# Patient Record
Sex: FEMALE | Race: OTHER
[De-identification: names, ages, dates, MRNs, and addresses within clinical notes are randomized per-mention and may not be internally consistent; named-entity substitution may affect disease eponyms.]

---

## 2022-06-21 PROBLEM — Z00.00 ENCOUNTER FOR PREVENTIVE HEALTH EXAMINATION: Status: ACTIVE | Noted: 2022-06-21

## 2022-07-06 ENCOUNTER — APPOINTMENT (OUTPATIENT)
Dept: PAIN MANAGEMENT | Facility: CLINIC | Age: 42
End: 2022-07-06

## 2022-07-13 ENCOUNTER — APPOINTMENT (OUTPATIENT)
Dept: PAIN MANAGEMENT | Facility: CLINIC | Age: 42
End: 2022-07-13

## 2022-07-13 PROCEDURE — 93040 RHYTHM ECG WITH REPORT: CPT | Mod: 59

## 2022-07-13 PROCEDURE — 64494 INJ PARAVERT F JNT L/S 2 LEV: CPT | Mod: RT

## 2022-07-13 PROCEDURE — 72100 X-RAY EXAM L-S SPINE 2/3 VWS: CPT

## 2022-07-13 PROCEDURE — 64495 INJ PARAVERT F JNT L/S 3 LEV: CPT | Mod: RT

## 2022-07-13 PROCEDURE — 64493 INJ PARAVERT F JNT L/S 1 LEV: CPT | Mod: RT

## 2022-07-13 PROCEDURE — 93770 DETERMINATION VENOUS PRESS: CPT | Mod: 25

## 2022-07-13 PROCEDURE — 00630 ANES PX LUMBAR REGION NOS: CPT | Mod: QZ,P2

## 2022-07-13 PROCEDURE — 94761 N-INVAS EAR/PLS OXIMETRY MLT: CPT | Mod: 25

## 2022-07-20 ENCOUNTER — APPOINTMENT (OUTPATIENT)
Dept: PAIN MANAGEMENT | Facility: CLINIC | Age: 42
End: 2022-07-20

## 2022-07-20 PROCEDURE — 64493 INJ PARAVERT F JNT L/S 1 LEV: CPT | Mod: LT

## 2022-07-20 PROCEDURE — 64495 INJ PARAVERT F JNT L/S 3 LEV: CPT | Mod: LT

## 2022-07-20 PROCEDURE — 94761 N-INVAS EAR/PLS OXIMETRY MLT: CPT

## 2022-07-20 PROCEDURE — 93770 DETERMINATION VENOUS PRESS: CPT | Mod: 25

## 2022-07-20 PROCEDURE — 72100 X-RAY EXAM L-S SPINE 2/3 VWS: CPT

## 2022-07-20 PROCEDURE — 00630 ANES PX LUMBAR REGION NOS: CPT | Mod: QZ,P2

## 2022-07-20 PROCEDURE — 64494 INJ PARAVERT F JNT L/S 2 LEV: CPT | Mod: LT

## 2022-07-20 PROCEDURE — 93040 RHYTHM ECG WITH REPORT: CPT | Mod: 59

## 2022-07-20 NOTE — PROCEDURE
[FreeTextEntry1] : LUMBAR MEDIAL BRANCH INJECTION UNDER FLUOROSCOPY\par \par  [FreeTextEntry3] : Preoperative Diagnosis: Lumbar spondylosis; facet arthropathy L3-4, L4-5, L5-S1\par Postoperative Diagnosis: Same\par Procedure:\par                    1. Diagnostic Lumbar medial branch nerve injection LEFT L3-4, L4-5, L5-S1\par                    2. Fluoroscopic guidance and localization of needle\par \par \par Physician: Ronnie Barron M.D.\par Anesthesiologist/CRNA: Ms. Baird\par Anesthesia: MAC\par Medical Necessity:  Failure of conservative management.\par \par \par \par Consent:  Though unusual, the possible complications including infection, bleeding, nerve damage, hospital admission, stroke, pneumothorax, death or failure of the procedure are theoretically possible. The patient was educated about the of the procedure and alternative therapies. All questions were answered and the patient freely gave consent to proceed.\par Indication for Fluoroscopy:  This procedure requires the precise placement of the spinal needle into the epidural space.  It is the only way to accurately and safely perform the injection.\par \par \par \par Procedure Note: \par After obtaining written consent, the patient was placed on the fluoroscopic table in the prone position with the pillow placed under the hips. A betadine prep was performed and the area surrounded by sterile drapes. A time out was performed.  Fluoroscopy unit was positioned over the patient and images of the spine (AP and oblique views) obtained.  The entry sites for the above noted levels median branch were determined and local anesthesia with lidocaine 2%-1cc was provided. At each level a 22guage 3 ½ inch spinal needle was placed at the level of the median branch to the facet under fluoroscopic guidance.  A dose of Lidocaine 2% 0.5cc was administered at each level the needles at each level were withdrawn following administration of the medication.  There was no significant bleeding at the site.  A dressing was placed by the assistant nurse. \par \par UCG- NEGATIVE\par \par \par \par Findings: Lumbar spine AP and oblique views with x-ray degenerative changes noted.\par \par \par Complications: none. \par \par Disposition: : I have examined the patient and there are no new physical findings since the original presentation.  Sensory and motor function were intact. The patient met discharge criteria see nurses notes. The discharge instruction sheet was reviewed and given to the patient. The patient was discharged home with a . \par \par Comment: Immediate relief today : 70%. If at least 70% relief or 50% greater than 24 hours, would proceed to RFA. If 50% relief is less than 24 hours, would repeat to confirm for RFA. If less than 50% relief, assess for other pain generators. Call if any problems\par \par Indication for RFA: The pt had a positive response to medial branch diagnostic injections and is considered a good candidate for the thermal RF ablation procedure. The diagnostic injection provided at least 50% reduction in pain for the duration of the local anesthetic.\par \par \par \par The following criteria have been met: 1) failed response to at least 3 months of conservative management; 2) patient has LBP that is non-radicular, suggesting facet joint origin supported by absence of nerve root compression documented on the medical record on H&P and radiographic evaluation; 3) minimum of 6 months has elapsed since any prior RF treatments. If prior ablation therapy has been performed, it provided at least 50% relief for minimum of 10-12 weeks; 4) no prior spinal fusion at the vertebral level being treated;\par \par \par \par This document was signed by:\par \par Ronnie Barron MD \par Board Certified, Anesthesiology \par Board Certified, Pain Medicine \par \par

## 2022-07-26 ENCOUNTER — APPOINTMENT (OUTPATIENT)
Dept: PAIN MANAGEMENT | Facility: CLINIC | Age: 42
End: 2022-07-26
Payer: COMMERCIAL

## 2022-07-26 ENCOUNTER — APPOINTMENT (OUTPATIENT)
Dept: PAIN MANAGEMENT | Facility: CLINIC | Age: 42
End: 2022-07-26

## 2022-07-26 PROCEDURE — 64635 DESTROY LUMB/SAC FACET JNT: CPT | Mod: RT

## 2022-07-26 PROCEDURE — 64636Z: CUSTOM | Mod: RT

## 2022-07-26 PROCEDURE — 72100 X-RAY EXAM L-S SPINE 2/3 VWS: CPT

## 2022-07-26 PROCEDURE — 93040 RHYTHM ECG WITH REPORT: CPT | Mod: 59

## 2022-07-26 PROCEDURE — 94761 N-INVAS EAR/PLS OXIMETRY MLT: CPT | Mod: 59

## 2022-07-26 PROCEDURE — 93770 DETERMINATION VENOUS PRESS: CPT | Mod: 59

## 2022-07-26 NOTE — PROCEDURE
[FreeTextEntry1] : MEDIAL BRANCH NERVE RHIZOTOMY- LUMBAR  LEVELS\par \par  [FreeTextEntry3] : reoperative Diagnosis: Lumbar facet arthropathy L3-4, L4-5, L5-S1\par Postoperative Diagnosis: Same\par Procedure: Neurotomy of the medial branch nerve of right  L3-4, L4-5, L5-S1\par Physician: Ronnie Barron M.D. \par Anesthesiologist/CRNA: Mr. Sullivan\par Anesthesia: MAC\par Medical Necessity: Facet arthropathy; intractable axial pain amenable only to medial branch nerve injections. Criteria met for a medial branch nerve neurotomy.\par Indication for Fluoroscopy:  This procedure requires the precise placement of the spinal needle into the epidural space.  It is the only way to accurately and safely perform the injection.\par \par \par \par Consent:  Though unusual, the possible complications including infection, bleeding, nerve damage, hospital admission, stroke, pneumothorax, death or failure of the procedure are theoretically possible. The patient was educated about the of the procedure and alternative therapies. All questions were answered and the patient freely gave consent to proceed.\par Prior to the procedure, we discussed the risks of the radiofrequency such as hematoma, infection, and nerve or spinal cord injury. The patient was also told that sometimes patients will notice lower extremity weakness immediately following the procedure due to extravasation of local anesthetic solution onto the main nerve root during the procedure. In addition, the patient was informed that 1 to 2 weeks of perioperative discomfort following the procedure is to be expected.\par UCG- NEGATIVE\par \par \par Monitoring:  Patient had continuous blood pressure, EKG, and pulse oximetry throughout the case. See nurse's notes.\par \par \par PROCEDURE NOTE:\par The patient was placed in a prone position on a fluoroscopy table.  The back was prepped and draped in a sterile fashion and a C-arm fluoroscopic device was used for localization of the radiofrequency target sites. A time out was performed. The [email or web address] 100 mm disposable Shyam*-coated cannula with a 10 mm active tip was adjusted via the sizing collar so that the electrode fit just inside the inner bevel at the end of the bare metal. Prior to beginning the procedure, the internal test mode function of the radiofrequency unit was checked to make sure the machine was functioning properly. The initial target zones included the junction of the transverse process and the pedicle at each level.\par \par At each target site, the radiofrequency cannula were placed in a parallel fashion to the x-ray beam and directed in a bulls-eye fashion down to the target zones. The cannula was then walked over the leading edge and advanced approximately 2 to 3 mm in an anterior direction.\par \par The second target zones included the superior and medial junction of the sacral ala. At each target site, a 2% Lidocaine solution was used to anesthetize the skin and subcutaneous tissues. Again, the radiofrequency cannulas were placed in a parallel fashion to the x-ray beam and directed in a bulls-eye fashion down to the target zones. The cannula was then walked over the leading edge and advanced approximately 2 to 3 mm in an anterior direction. A lateral view of the spine was performed to insure that the cannulas were not too far to the opening of the foraminal canals. Sensory stimulation at 50 hertz was performed at each target area. Referral of the sensory stimulation was noted at less than 1 volt over the paravertebral area or hip. Motor dissociation at 2 hertz was obtained by stimulating up to 3 times the voltage of the sensory stimulation and insuring a lack of motor movement in the lower extremities. Once the radiofrequency cannula was in correct position, a 2% Lidocaine with 10mg of dexamethasone solution was used to rapidly anesthetize the lesion site. After a thirty second delay, thermal lesions were then created at each level for 90 seconds at a temperature of 80¿ C. After the lesions were created, the cannulas were removed and sterile bandages placed at the puncture sites. The patient tolerated the procedure well.\par \par \par \par Findings: Lumbar spine AP and oblique views with x-ray degenerative changes noted.\par \par Complications: None. The patient tolerated the procedure well.\par \par Disposition: I have examined the patient and there are no new physical findings since the original presentation.  Sensory and motor function were intact. The patient met discharge criteria see nurses' notes. The discharge instruction sheet was reviewed and given to the patient. The patient was discharged home with a . \par .\par \par Comment: RFA today, follow up in 2-4 weeks. Call if any problem.\par \par Indication for RFA: The pt had a positive response to medial branch diagnostic injections and is considered a good candidate for the thermal RF ablation procedure. The diagnostic injection provided at least 50% reduction in pain for the duration of the local anesthetic. \par The following criteria have been met: 1) failed response to at least 3 months of conservative management; 2) patient has LBP that is non-radicular, suggesting facet joint origin supported by absence of nerve root compression documented on the medical record on H&P and radiographic evaluation; 3) minimum of 6 months has elapsed since any prior RF treatments. If prior ablation therapy has been performed, it provided at least 50% relief for minimum of 10-12 weeks; 4) no prior spinal fusion at the vertebral level being treated;\par \par \par \par This document was signed by:\par \par Ronnie Barron MD \par Board Certified, Anesthesiology \par Board Certified, Pain Medicine  \par

## 2022-07-26 NOTE — PROCEDURE
[FreeTextEntry1] : MEDIAL BRANCH NERVE RHIZOTOMY- LUMBAR    [FreeTextEntry3] : \par MEDIAL BRANCH NERVE RHIZOTOMY- LUMBAR  LEVELS\par \par \par \par Date:  07/26/2022\par \par Patient: TATIANA MAURO\par \par  \par \par  \par \par \par Preoperative Diagnosis: Lumbar facet arthropathy L3-4, L4-5, L5-S1\par Postoperative Diagnosis: Same\par Procedure: Neurotomy of the medial branch nerve of right  L3-4, L4-5, L5-S1\par Physician: Ronnie Barron M.D. \par Anesthesiologist/CRNA: Mr. Sullivan\par Anesthesia: MAC\par Medical Necessity: Facet arthropathy; intractable axial pain amenable only to medial branch nerve injections. Criteria met for a medial branch nerve neurotomy.\par Indication for Fluoroscopy:  This procedure requires the precise placement of the spinal needle into the epidural space.  It is the only way to accurately and safely perform the injection.\par \par \par \par Consent:  Though unusual, the possible complications including infection, bleeding, nerve damage, hospital admission, stroke, pneumothorax, death or failure of the procedure are theoretically possible. The patient was educated about the of the procedure and alternative therapies. All questions were answered and the patient freely gave consent to proceed.\par Prior to the procedure, we discussed the risks of the radiofrequency such as hematoma, infection, and nerve or spinal cord injury. The patient was also told that sometimes patients will notice lower extremity weakness immediately following the procedure due to extravasation of local anesthetic solution onto the main nerve root during the procedure. In addition, the patient was informed that 1 to 2 weeks of perioperative discomfort following the procedure is to be expected.\par \par \par \par Monitoring:  Patient had continuous blood pressure, EKG, and pulse oximetry throughout the case. See nurse's notes.\par \par \par PROCEDURE NOTE:\par The patient was placed in a prone position on a fluoroscopy table.  The back was prepped and draped in a sterile fashion and a C-arm fluoroscopic device was used for localization of the radiofrequency target sites. A time out was performed. The [employer] 100 mm disposable Shyam*-coated cannula with a 10 mm active tip was adjusted via the sizing collar so that the electrode fit just inside the inner bevel at the end of the bare metal. Prior to beginning the procedure, the internal test mode function of the radiofrequency unit was checked to make sure the machine was functioning properly. The initial target zones included the junction of the transverse process and the pedicle at each level.\par \par At each target site, the radiofrequency cannula were placed in a parallel fashion to the x-ray beam and directed in a bulls-eye fashion down to the target zones. The cannula was then walked over the leading edge and advanced approximately 2 to 3 mm in an anterior direction.\par \par The second target zones included the superior and medial junction of the sacral ala. At each target site, a 2% Lidocaine solution was used to anesthetize the skin and subcutaneous tissues. Again, the radiofrequency cannulas were placed in a parallel fashion to the x-ray beam and directed in a bulls-eye fashion down to the target zones. The cannula was then walked over the leading edge and advanced approximately 2 to 3 mm in an anterior direction. A lateral view of the spine was performed to insure that the cannulas were not too far to the opening of the foraminal canals. Sensory stimulation at 50 hertz was performed at each target area. Referral of the sensory stimulation was noted at less than 1 volt over the paravertebral area or hip. Motor dissociation at 2 hertz was obtained by stimulating up to 3 times the voltage of the sensory stimulation and insuring a lack of motor movement in the lower extremities. Once the radiofrequency cannula was in correct position, a 2% Lidocaine with 10mg of dexamethasone solution was used to rapidly anesthetize the lesion site. After a thirty second delay, thermal lesions were then created at each level for 90 seconds at a temperature of 80¿ C. After the lesions were created, the cannulas were removed and sterile bandages placed at the puncture sites. The patient tolerated the procedure well.\par \par \par \par Findings: Lumbar spine AP and oblique views with x-ray degenerative changes noted.\par \par Complications: None. The patient tolerated the procedure well.\par \par Disposition: I have examined the patient and there are no new physical findings since the original presentation.  Sensory and motor function were intact. The patient met discharge criteria see nurses' notes. The discharge instruction sheet was reviewed and given to the patient. The patient was discharged home with a . \par .\par \par Comment: RFA today, follow up in 2-4 weeks. Call if any problem.\par \par Indication for RFA: The pt had a positive response to medial branch diagnostic injections and is considered a good candidate for the thermal RF ablation procedure. The diagnostic injection provided at least 50% reduction in pain for the duration of the local anesthetic. \par The following criteria have been met: 1) failed response to at least 3 months of conservative management; 2) patient has LBP that is non-radicular, suggesting facet joint origin supported by absence of nerve root compression documented on the medical record on H&P and radiographic evaluation; 3) minimum of 6 months has elapsed since any prior RF treatments. If prior ablation therapy has been performed, it provided at least 50% relief for minimum of 10-12 weeks; 4) no prior spinal fusion at the vertebral level being treated;\par \par \par \par This document was signed by:\par \par Ronnie Barron MD \par Board Certified, Anesthesiology \par Board Certified, Pain Medicine

## 2022-07-26 NOTE — PROCEDURE
[FreeTextEntry1] : MEDIAL BRANCH NERVE RHIZOTOMY- LUMBAR  LEVELS\par \par  [FreeTextEntry3] : reoperative Diagnosis: Lumbar facet arthropathy L3-4, L4-5, L5-S1\par Postoperative Diagnosis: Same\par Procedure: Neurotomy of the medial branch nerve of right  L3-4, L4-5, L5-S1\par Physician: Ronnie Barron M.D. \par Anesthesiologist/CRNA: Mr. Sullivan\par Anesthesia: MAC\par Medical Necessity: Facet arthropathy; intractable axial pain amenable only to medial branch nerve injections. Criteria met for a medial branch nerve neurotomy.\par Indication for Fluoroscopy:  This procedure requires the precise placement of the spinal needle into the epidural space.  It is the only way to accurately and safely perform the injection.\par \par \par \par Consent:  Though unusual, the possible complications including infection, bleeding, nerve damage, hospital admission, stroke, pneumothorax, death or failure of the procedure are theoretically possible. The patient was educated about the of the procedure and alternative therapies. All questions were answered and the patient freely gave consent to proceed.\par Prior to the procedure, we discussed the risks of the radiofrequency such as hematoma, infection, and nerve or spinal cord injury. The patient was also told that sometimes patients will notice lower extremity weakness immediately following the procedure due to extravasation of local anesthetic solution onto the main nerve root during the procedure. In addition, the patient was informed that 1 to 2 weeks of perioperative discomfort following the procedure is to be expected.\par UCG- NEGATIVE\par \par \par Monitoring:  Patient had continuous blood pressure, EKG, and pulse oximetry throughout the case. See nurse's notes.\par \par \par PROCEDURE NOTE:\par The patient was placed in a prone position on a fluoroscopy table.  The back was prepped and draped in a sterile fashion and a C-arm fluoroscopic device was used for localization of the radiofrequency target sites. A time out was performed. The [employer] 100 mm disposable Shyam*-coated cannula with a 10 mm active tip was adjusted via the sizing collar so that the electrode fit just inside the inner bevel at the end of the bare metal. Prior to beginning the procedure, the internal test mode function of the radiofrequency unit was checked to make sure the machine was functioning properly. The initial target zones included the junction of the transverse process and the pedicle at each level.\par \par At each target site, the radiofrequency cannula were placed in a parallel fashion to the x-ray beam and directed in a bulls-eye fashion down to the target zones. The cannula was then walked over the leading edge and advanced approximately 2 to 3 mm in an anterior direction.\par \par The second target zones included the superior and medial junction of the sacral ala. At each target site, a 2% Lidocaine solution was used to anesthetize the skin and subcutaneous tissues. Again, the radiofrequency cannulas were placed in a parallel fashion to the x-ray beam and directed in a bulls-eye fashion down to the target zones. The cannula was then walked over the leading edge and advanced approximately 2 to 3 mm in an anterior direction. A lateral view of the spine was performed to insure that the cannulas were not too far to the opening of the foraminal canals. Sensory stimulation at 50 hertz was performed at each target area. Referral of the sensory stimulation was noted at less than 1 volt over the paravertebral area or hip. Motor dissociation at 2 hertz was obtained by stimulating up to 3 times the voltage of the sensory stimulation and insuring a lack of motor movement in the lower extremities. Once the radiofrequency cannula was in correct position, a 2% Lidocaine with 10mg of dexamethasone solution was used to rapidly anesthetize the lesion site. After a thirty second delay, thermal lesions were then created at each level for 90 seconds at a temperature of 80¿ C. After the lesions were created, the cannulas were removed and sterile bandages placed at the puncture sites. The patient tolerated the procedure well.\par \par \par \par Findings: Lumbar spine AP and oblique views with x-ray degenerative changes noted.\par \par Complications: None. The patient tolerated the procedure well.\par \par Disposition: I have examined the patient and there are no new physical findings since the original presentation.  Sensory and motor function were intact. The patient met discharge criteria see nurses' notes. The discharge instruction sheet was reviewed and given to the patient. The patient was discharged home with a . \par .\par \par Comment: RFA today, follow up in 2-4 weeks. Call if any problem.\par \par Indication for RFA: The pt had a positive response to medial branch diagnostic injections and is considered a good candidate for the thermal RF ablation procedure. The diagnostic injection provided at least 50% reduction in pain for the duration of the local anesthetic. \par The following criteria have been met: 1) failed response to at least 3 months of conservative management; 2) patient has LBP that is non-radicular, suggesting facet joint origin supported by absence of nerve root compression documented on the medical record on H&P and radiographic evaluation; 3) minimum of 6 months has elapsed since any prior RF treatments. If prior ablation therapy has been performed, it provided at least 50% relief for minimum of 10-12 weeks; 4) no prior spinal fusion at the vertebral level being treated;\par \par \par \par This document was signed by:\par \par Ronnie Barron MD \par Board Certified, Anesthesiology \par Board Certified, Pain Medicine  \par

## 2022-08-01 ENCOUNTER — APPOINTMENT (OUTPATIENT)
Dept: PAIN MANAGEMENT | Facility: CLINIC | Age: 42
End: 2022-08-01
Payer: COMMERCIAL

## 2022-08-01 ENCOUNTER — APPOINTMENT (OUTPATIENT)
Dept: PAIN MANAGEMENT | Facility: CLINIC | Age: 42
End: 2022-08-01

## 2022-08-01 PROCEDURE — 93770 DETERMINATION VENOUS PRESS: CPT | Mod: 25

## 2022-08-01 PROCEDURE — 93040 RHYTHM ECG WITH REPORT: CPT | Mod: 59

## 2022-08-01 PROCEDURE — 72100 X-RAY EXAM L-S SPINE 2/3 VWS: CPT

## 2022-08-01 PROCEDURE — 00630 ANES PX LUMBAR REGION NOS: CPT | Mod: QZ,P2

## 2022-08-01 PROCEDURE — 64636Z: CUSTOM | Mod: LT

## 2022-08-01 PROCEDURE — 94761 N-INVAS EAR/PLS OXIMETRY MLT: CPT

## 2022-08-01 PROCEDURE — 64635 DESTROY LUMB/SAC FACET JNT: CPT | Mod: LT

## 2022-08-01 NOTE — PROCEDURE
[FreeTextEntry1] : MEDIAL BRANCH NERVE RHIZOTOMY- LUMBAR  LEVELS [FreeTextEntry3] : Preoperative Diagnosis: Lumbar facet arthropathy L3-4, L4-5, L5-S1\par Postoperative Diagnosis: Same\par Procedure: Neurotomy of the medial branch nerve of left  L3-4, L4-5, L5-S1\par Physician: Ronnie Barron M.D. \par Anesthesiologist/CRNA: Ms. Baird\par Anesthesia: MAC\par Medical Necessity: Facet arthropathy; intractable axial pain amenable only to medial branch nerve injections. Criteria met for a medial branch nerve neurotomy.\par Indication for Fluoroscopy:  This procedure requires the precise placement of the spinal needle into the epidural space.  It is the only way to accurately and safely perform the injection.\par \par \par \par Consent:  Though unusual, the possible complications including infection, bleeding, nerve damage, hospital admission, stroke, pneumothorax, death or failure of the procedure are theoretically possible. The patient was educated about the of the procedure and alternative therapies. All questions were answered and the patient freely gave consent to proceed.\par Prior to the procedure, we discussed the risks of the radiofrequency such as hematoma, infection, and nerve or spinal cord injury. The patient was also told that sometimes patients will notice lower extremity weakness immediately following the procedure due to extravasation of local anesthetic solution onto the main nerve root during the procedure. In addition, the patient was informed that 1 to 2 weeks of perioperative discomfort following the procedure is to be expected.\par \par \par \par Monitoring:  Patient had continuous blood pressure, EKG, and pulse oximetry throughout the case. See nurse's notes.\par \par \par PROCEDURE NOTE:\par The patient was placed in a prone position on a fluoroscopy table.  The back was prepped and draped in a sterile fashion and a C-arm fluoroscopic device was used for localization of the radiofrequency target sites. A time out was performed. The Webtogs  mm disposable Shyam*-coated cannula with a 10 mm active tip was adjusted via the sizing collar so that the electrode fit just inside the inner bevel at the end of the bare metal. Prior to beginning the procedure, the internal test mode function of the radiofrequency unit was checked to make sure the machine was functioning properly. The initial target zones included the junction of the transverse process and the pedicle at each level.\par \par At each target site, the radiofrequency cannula were placed in a parallel fashion to the x-ray beam and directed in a bulls-eye fashion down to the target zones. The cannula was then walked over the leading edge and advanced approximately 2 to 3 mm in an anterior direction.\par \par The second target zones included the superior and medial junction of the sacral ala. At each target site, a 2% Lidocaine solution was used to anesthetize the skin and subcutaneous tissues. Again, the radiofrequency cannulas were placed in a parallel fashion to the x-ray beam and directed in a bulls-eye fashion down to the target zones. The cannula was then walked over the leading edge and advanced approximately 2 to 3 mm in an anterior direction. A lateral view of the spine was performed to insure that the cannulas were not too far to the opening of the foraminal canals. Sensory stimulation at 50 hertz was performed at each target area. Referral of the sensory stimulation was noted at less than 1 volt over the paravertebral area or hip. Motor dissociation at 2 hertz was obtained by stimulating up to 3 times the voltage of the sensory stimulation and insuring a lack of motor movement in the lower extremities. Once the radiofrequency cannula was in correct position, a 2% Lidocaine with 10mg of dexamethasone solution was used to rapidly anesthetize the lesion site. After a thirty second delay, thermal lesions were then created at each level for 90 seconds at a temperature of 80¿ C. After the lesions were created, the cannulas were removed and sterile bandages placed at the puncture sites. The patient tolerated the procedure well.\par \par \par \par Findings: Lumbar spine AP and oblique views with x-ray degenerative changes noted.\par \par Complications: None. The patient tolerated the procedure well.\par \par Disposition: I have examined the patient and there are no new physical findings since the original presentation.  Sensory and motor function were intact. The patient met discharge criteria see nurses' notes. The discharge instruction sheet was reviewed and given to the patient. The patient was discharged home with a . \par .\par \par Comment: RFA today, follow up in 2-4 weeks. Call if any problem.\par \par Indication for RFA: The pt had a positive response to medial branch diagnostic injections and is considered a good candidate for the thermal RF ablation procedure. The diagnostic injection provided at least 50% reduction in pain for the duration of the local anesthetic. \par The following criteria have been met: 1) failed response to at least 3 months of conservative management; 2) patient has LBP that is non-radicular, suggesting facet joint origin supported by absence of nerve root compression documented on the medical record on H&P and radiographic evaluation; 3) minimum of 6 months has elapsed since any prior RF treatments. If prior ablation therapy has been performed, it provided at least 50% relief for minimum of 10-12 weeks; 4) no prior spinal fusion at the vertebral level being treated;\par \par \par \par This document was signed by:\par \par Ronnie Barron MD \par Board Certified, Anesthesiology \par Board Certified, Pain Medicine  \par

## 2022-08-29 ENCOUNTER — APPOINTMENT (OUTPATIENT)
Dept: PAIN MANAGEMENT | Facility: CLINIC | Age: 42
End: 2022-08-29

## 2022-08-29 VITALS
HEIGHT: 63 IN | WEIGHT: 220 LBS | HEART RATE: 98 BPM | BODY MASS INDEX: 38.98 KG/M2 | SYSTOLIC BLOOD PRESSURE: 159 MMHG | DIASTOLIC BLOOD PRESSURE: 98 MMHG

## 2022-08-29 PROCEDURE — 96136 PSYCL/NRPSYC TST PHY/QHP 1ST: CPT | Mod: 59

## 2022-08-29 PROCEDURE — 99215 OFFICE O/P EST HI 40 MIN: CPT | Mod: 25

## 2022-08-29 NOTE — HISTORY OF PRESENT ILLNESS
[FreeTextEntry1] : Ms. Bosch is a 41-year-old woman who presents today with complaint of excruciating back pain with radiation to the right lower extremity. Pain has been present for the past couple of months, worse this past week. Pain developed after no injury, accident, or fall. She describes it as severe, rating it a 10 right now and 10 at worst. Pain is constant shooting, sharp, pressure-like, cramping, and throbbing sensation that worsens in no typical pattern. Coughing/sneezing and bowel movements increase patient's pain. Lying down, standing, sitting, and walking decrease patient's pain for approximately 10-15 minutes. Denies recent change in bowel or bladder habits. Patient was taking gabapentin but wishes to discontinue secondary to side effects of drowsiness and brain fog.\par \par TODAY: Since last visit, she underwent a Left L3-S1 radiofrequency ablation on 8-1-2022 and a Right L3-S1 radiofrequency ablation on 7- with approximately 90% relief of her axial lower back pain. \par \par Today, she is presenting with pain that starts in her right sided buttock area and radiates into the right lower extremity. She notes associated numbness and tingling in the right leg. She states at times it hurts to walk after sitting for a long period of time. She currently rates the pain at a 8/10 on the pain scale. She states the pain is present daily. \par

## 2022-08-29 NOTE — DATA REVIEWED
[FreeTextEntry1] : MRI of lumbar spine dated 01/26/2022 revealed mild desiccated mildly bulging lower lumbar disc L3-4 to L5-1. L5-S1 central right central disc herniation with inferior extrusion displaces the right S1 root. L5-1 mild facet hypertrophy and mild foraminal narrowing.\par \par SOAPP: Scored a 0 , low risk.\par  \par [place] REGISTRY: Reviewed .  \par  \par UDS: No data obtained today. \par  \par Medications that trigger a UDS: Benzodiazepines (Ativan, Xanax, Valium) etc, Barbiturates, Narcotics (Avinza, Butrans, hydrocodone, Codeine, Catarina, Methadone, Morphine, MS Contin, Opana, oxycodone, Oxycontin, Suboxone etc), Pregabalin (Lyrica), Tramadol (Ultacet, Utram etc), Tapentadol, (Nucynta) and Elist Drugs (cocaine, THC, Etc.)\par  \par Risk factors: Bipolar Illness, positive for any an illicit drugs, history of any ETOH and drug abuse, any signs of diversion, Sharing Meds, selling meds. Non consistent [place] drug reporting and above 120meq of morphine\par  \par Low risk: Patient has combination of a low risk SOAP and no risk factors. UDS would be repeated randomly every quarter\par

## 2022-08-29 NOTE — PHYSICAL EXAM
[de-identified] : Constitutional\par GENERAL APPEARANCE OF PATIENT IS WELL DEVELOPED, WELL NOURISHED, BODY HABITUS NORMAL, WELL GROOMED, NO DEFORMITIES NOTED. \par \par Head\par -          Atraumatic and Normocephalic \par \par Eyes, Nose, and Throat:\par -          External inspection of ears and nose are normal overall without scars, lesions, or masses noted\par -          Assessment of hearing is normal\par \par Neck\par -          Examination of neck shows no masses, overall appearance is normal, neck is symmetric, tracheal position is midline, no crepitus is noted\par -          Examination of thyroid shows no enlargement, tenderness or masses\par \par Respiratory\par -          Assessment of respiratory effort shows no intercostal retractions, no use of accessory muscles, unlabored breathing, and normal diaphragmatic movement.\par \par Cardiovascular\par -          Examination of extremities show no edema or varicosities\par \par Musculoskeletal\par -           Inspection and palpation of digits and nails shows no clubbing, cyanosis, nodules, drainage, fluctuance, petechiae\par \par 1)         Spine- tenderness into the lumbar paraspinals. ROM restricted. Pain with flexion. Positive SLR on the right. \par \par 2)         Neck- inspection and palpation shows no misalignment, asymmetry, crepitation, defects, tenderness, masses, effusions. ROM is normal without crepitation or contracture. No instability or subluxation or laxity is noted. No abnormal movements.\par \par 3)         RUE- inspection and palpation shows no misalignment, asymmetry, crepitation, defects, tenderness, masses, effusions. ROM is normal without crepitation or contracture. No instability or subluxation or laxity is noted. No abnormal movements.\par \par 4)         LUE-inspection and palpation shows no misalignment, asymmetry, crepitation, defects, tenderness, masses, effusions. ROM is normal without crepitation or contracture. No instability or subluxation or laxity is noted. No abnormal movements.\par \par 5)         RLE- inspection and palpation shows no misalignment, asymmetry, crepitation, defects, tenderness, masses, effusions. ROM is normal without crepitation or contracture. No instability or subluxation or laxity is noted. No abnormal movements.\par \par 6)         LLE-inspection and palpation shows no misalignment, asymmetry, crepitation, defects, tenderness, masses, effusions. ROM is normal without crepitation or contracture. No instability or subluxation or laxity is noted. No abnormal movements.\par \par Skin\par -           Inspection of skin and subcutaneous tissue shows no rashes, lesions or ulcers\par -           Palpation of skin and subcutaneous tissue shows no rashes, no indurations, subcutaneous nodules or tightening.\par \par  Abdomen\par -           Soft; Non-tender\par \par Neurologic\par -           CN 2-12 are grossly intact\par -           No sensory or motor deficits in the upper and lower extremities\par -           Adequate strength in upper and lower extremities\par \par Psychiatric\par -           Patients judgment and insight are intact\par -           Oriented to time, place and person\par -           Recent and remote memory intact.\par

## 2022-08-29 NOTE — ASSESSMENT
[FreeTextEntry1] : 42 year old female presenting with lumbar radicular pain. Patient is presenting with radicular pain with impairment in ADLs and functionality.  The pain has not responded to conservative care, including medications, stretching, as well as active modalities, such as physical therapy.  Imaging studies as well as physical exam findings corroborate the symptomatology and radicular pain.  We will proceed with an epidural at this point. We will proceed with a Right L5-S1 transforaminal epidural steroid injection with sedation. We will clear her to return back to work on 9-. In addition, I will also have her receive a Lumbar LSO brace. Follow up in 6 weeks will be made for reassessment. I have explained the findings to the patient and all questions have been answered.\par \par Right L5-S1 transforaminal epidural steroid injection with sedation.\par \par Patient had a MRI that shows a radicular component along with pain referred into the lower extremity. Patient has trialed rehab (Home exercise, physical therapy or chiropractic care) and medications I will schedule a L4-5 SNRI.\par \par Risk, benefits, pros and cons of procedure were explained to the patient using models and diagrams and their questions were answered. \par \par \par The patient has severe anxiety of procedures that necessitates monitored anesthesia care (MAC). The procedure performed will be close to major nerves, arteries, and spinal cord and/or joint structures. Due to the proximity of these structures, we need the patient to be still during the procedure.  With the help of MAC, this will be safely achieved and decrease the risk of any complications.\par \par Patient has pain in spinal movement along with weakness in extension and flexion. I will put in for a lumbar brace with lateral supports to be worn no more than 4 hours a day and 2 hours in a row to decrease facet and disc load, to decrease pain, increase activity, increase function, to reduce pain by restricting mobility of the trunk, to facilitate healing of the spine and related Soft tissues and to support weak spinal muscles used to help the patient with rehab and home exercise program stressing walking.  Other exercises discussed include swimming, elliptical , recumbent bike, Henry chi and Yoga. Use things that heat like hot shower or icy heat before rehab and exercising and at the beginning of the day, and ice (ice in a bag never directly on the skin) after activity and at the end of the day.\par \par Neuropsychological SOAPP and PCS testing was performed as an evaluation of cognition, mood, personality, behavior to assess likelihood of addiction, misuse, other aberrant medication-related behaviors, and different thoughts and feelings that may be associated with pain. The total time spent rendering and interpreting the service was approximately 20 minutes. Results will be implemented in the appropriate care of the patient\par \par A total of 46 minutes was spent on this visit, reviewing previous notes/consultations/imaging, counseling the patient on appropriate biomechanics impacting the pain, ordering tests if needed, refilling meds if needed, and documenting the findings in the note.\par \par Entered by Carol Danielle, acting as scribe for Dr. Barron.\par  \par The documentation recorded by the scribe, in my presence, accurately reflects the service I personally performed, and the decisions made by me with my edits as appropriate.\par  \par Best Regards, \par Ronnie Barron MD \par Board Certified, Anesthesiology \par Board Certified, Pain Medicine\par \par \par

## 2022-09-21 ENCOUNTER — APPOINTMENT (OUTPATIENT)
Dept: PAIN MANAGEMENT | Facility: CLINIC | Age: 42
End: 2022-09-21

## 2022-09-21 ENCOUNTER — APPOINTMENT (OUTPATIENT)
Dept: PAIN MANAGEMENT | Facility: CLINIC | Age: 42
End: 2022-09-21
Payer: COMMERCIAL

## 2022-09-21 PROCEDURE — 00630 ANES PX LUMBAR REGION NOS: CPT | Mod: QZ,P2

## 2022-09-21 PROCEDURE — 94761 N-INVAS EAR/PLS OXIMETRY MLT: CPT

## 2022-09-21 PROCEDURE — 93040 RHYTHM ECG WITH REPORT: CPT | Mod: 59

## 2022-09-21 PROCEDURE — 93770 DETERMINATION VENOUS PRESS: CPT | Mod: 59

## 2022-09-21 PROCEDURE — 72100 X-RAY EXAM L-S SPINE 2/3 VWS: CPT

## 2022-09-21 PROCEDURE — 64483 NJX AA&/STRD TFRM EPI L/S 1: CPT | Mod: RT

## 2022-09-21 NOTE — PROCEDURE
[FreeTextEntry3] : Preoperative Diagnosis: Lumbar Radiculopathy\par \par Procedure:\par \par 1. Selective Right L5-S1 Transforaminal Lumbar Epidural Nerve Root Injection under Fluoroscopy\par \par 2. Epidurography\par \par 3. Fluoroscopic guidance and localization of needle\par \par Physician: Ronnie Barron M.D.\par \par Anesthesiologist/CRNA: Oli\par \par Anesthesia: MAC\par \par Medical Necessity: Failure of conservative management.\par \par Consent: Though unusual, the possible complications including infection, bleeding, nerve damage, hospital admission, stroke, pneumothorax, death or failure of the procedure are theoretically possible. The patient was educated about the of the procedure and alternative therapies. All questions were answered and the patient freely gave consent to proceed.\par \par Indication for Fluoroscopy: This procedure requires the precise placement of the spinal needle into the epidural space. It is the only way to accurately and safely perform the injection.\par \par PROCEDURE NOTE:\par \par After obtaining written consent, the patient was then positioned on the fluoroscopy table in the prone position with a pillow beneath the pelvis to reduce lumbar lordosis. The lumbar area was prepped with betadine solution and draped in the usual manner. A time out was performed. The fluoroscope was used to identify the L3///L4///L5 vertebral body on the AP projection. It was then rotated into an oblique projection until the superior articulating process of the S1 (inferior) vertebra is projected beneath the 6 o'clock position of the L5 (superior) vertebrae. The 22 gauge 5inch needle was inserted in the skin at a point overlying the superior articulating process of the inferior vertebra and aimed for the 6 o'clock position of the superior vertebrae's pedicle. After the needle contacted bone, a lateral projection was obtained to insure that the needle tip was in proximity with the vertebral body. Paresthesias were not noted. One ml of Omnipaque 240 was injected and a neurogram was obtained. Following demonstration of the neurogram, 1 ml of Preservative free normal saline and 1 ml of dexamethasone (10mg) was injected. The small volume and relatively high concentration was chosen to preserve selectivity and diagnostic value of the injection. There was no CSF or heme identified.\par \par Epidurogram: The nerve root was observed in its outline on the neurogram. Distal and proximal spread was noted.\par \par Findings: Lumbar Spine AP and oblique views with x-ray degenerative changes noted.\par \par Complications: none.\par \par Disposition: I have examined the patient and there are no new physical findings since original presentation. The patient was discharged home with a . The discharge instruction sheet was given to the patient. Motor function was intact.\par \par Comment: 1st TFESI today, depending on effectiveness would schedule 2nd TFESI in 1-2 weeks vs caudal epidural steroid vs follow up in office. Call if any problems\par \par Ronnie Barron MD \par Board Certified, Anesthesiology \par Board Certified, Pain Medicine

## 2022-09-28 ENCOUNTER — APPOINTMENT (OUTPATIENT)
Dept: PAIN MANAGEMENT | Facility: CLINIC | Age: 42
End: 2022-09-28

## 2022-10-05 ENCOUNTER — APPOINTMENT (OUTPATIENT)
Dept: PAIN MANAGEMENT | Facility: CLINIC | Age: 42
End: 2022-10-05

## 2022-10-27 ENCOUNTER — APPOINTMENT (OUTPATIENT)
Dept: PAIN MANAGEMENT | Facility: CLINIC | Age: 42
End: 2022-10-27

## 2022-10-27 VITALS
WEIGHT: 220 LBS | SYSTOLIC BLOOD PRESSURE: 166 MMHG | BODY MASS INDEX: 38.98 KG/M2 | HEART RATE: 82 BPM | DIASTOLIC BLOOD PRESSURE: 99 MMHG | HEIGHT: 63 IN

## 2022-10-27 DIAGNOSIS — M51.26 OTHER INTERVERTEBRAL DISC DEGENERATION, LUMBAR REGION: ICD-10-CM

## 2022-10-27 DIAGNOSIS — M51.36 OTHER INTERVERTEBRAL DISC DEGENERATION, LUMBAR REGION: ICD-10-CM

## 2022-10-27 DIAGNOSIS — M54.16 RADICULOPATHY, LUMBAR REGION: ICD-10-CM

## 2022-10-27 PROCEDURE — 99214 OFFICE O/P EST MOD 30 MIN: CPT

## 2022-10-31 PROBLEM — M54.16 LUMBAR RADICULOPATHY, ACUTE: Status: ACTIVE | Noted: 2022-08-29

## 2022-10-31 PROBLEM — M51.36 DEGENERATION OF LUMBAR INTERVERTEBRAL DISC WITH ACUTE HERNIATION: Status: ACTIVE | Noted: 2022-08-29

## 2022-10-31 NOTE — ASSESSMENT
[FreeTextEntry1] : 42 year old female presenting with lumbar radicular pain. Patient is presenting with radicular pain with impairment in ADLs and functionality.  She has not had any change in symptoms since the last visit with us. The pain has not responded to conservative care, including medications, stretching, as well as active modalities, such as physical therapy.  Imaging studies as well as physical exam findings corroborate the symptomatology and radicular pain.  We would like to proceed with a Right L5-S1 transforaminal epidural steroid injection with sedation. She states that her insurance will  at the end of October. Patient will call the office once she obtains new insurance.  She will will not return to work until January after her follow up appointment. \par \par Patient had a MRI that shows a radicular component along with pain referred into the lower extremity. Patient has trailed rehab (Home exercise, physical therapy or chiropractic care) and medications I will schedule a L4-5 SNRI.\par \par Risk, benefits, pros and cons of procedure were explained to the patient using models and diagrams and their questions were answered. \par \par \par The patient has severe anxiety of procedures that necessitates monitored anesthesia care (MAC). The procedure performed will be close to major nerves, arteries, and spinal cord and/or joint structures. Due to the proximity of these structures, we need the patient to be still during the procedure.  With the help of MAC, this will be safely achieved and decrease the risk of any complications.\par \par Patient has pain in spinal movement along with weakness in extension and flexion. I will put in for a lumbar brace with lateral supports to be worn no more than 4 hours a day and 2 hours in a row to decrease facet and disc load, to decrease pain, increase activity, increase function, to reduce pain by restricting mobility of the trunk, to facilitate healing of the spine and related Soft tissues and to support weak spinal muscles used to help the patient with rehab and home exercise program stressing walking.  Other exercises discussed include swimming, elliptical , recumbent bike, Henry chi and Yoga. Use things that heat like hot shower or icy heat before rehab and exercising and at the beginning of the day, and ice (ice in a bag never directly on the skin) after activity and at the end of the day.\par \par Neuropsychological SOAPP and PCS testing was performed as an evaluation of cognition, mood, personality, behavior to assess likelihood of addiction, misuse, other aberrant medication-related behaviors, and different thoughts and feelings that may be associated with pain. The total time spent rendering and interpreting the service was approximately 20 minutes. Results will be implemented in the appropriate care of the patient\par \par A total of 40 minutes was spent on this visit, reviewing previous notes/consultations/imaging, counseling the patient on appropriate biomechanics impacting the pain, ordering tests if needed, refilling medications if needed, and documenting the findings in the note.\par \par Entered by Jamee Rose, acting as scribe for Dr. Barron.\par  \par The documentation recorded by the scribe, in my presence, accurately reflects the service I personally performed, and the decisions made by me with my edits as appropriate.\par  \par Best Regards, \par Ronnie Barron MD \par Board Certified, Anesthesiology \par Board Certified, Pain Medicine\par \par \par

## 2022-10-31 NOTE — PHYSICAL EXAM
[de-identified] : Constitutional\par GENERAL APPEARANCE OF PATIENT IS WELL DEVELOPED, WELL NOURISHED, BODY HABITUS NORMAL, WELL GROOMED, NO DEFORMITIES NOTED. \par \par Head\par -          Atraumatic and Normocephalic \par \par Eyes, Nose, and Throat:\par -          External inspection of ears and nose are normal overall without scars, lesions, or masses noted\par -          Assessment of hearing is normal\par \par Neck\par -          Examination of neck shows no masses, overall appearance is normal, neck is symmetric, tracheal position is midline, no crepitus is noted\par -          Examination of thyroid shows no enlargement, tenderness or masses\par \par Respiratory\par -          Assessment of respiratory effort shows no intercostal retractions, no use of accessory muscles, unlabored breathing, and normal diaphragmatic movement.\par \par Cardiovascular\par -          Examination of extremities show no edema or varicosities\par \par Musculoskeletal\par -           Inspection and palpation of digits and nails shows no clubbing, cyanosis, nodules, drainage, fluctuance, petechiae\par \par 1)         Spine- tenderness into the lumbar paraspinals. ROM restricted. Pain with flexion. Positive SLR on the right. \par \par 2)         Neck- inspection and palpation shows no misalignment, asymmetry, crepitation, defects, tenderness, masses, effusions. ROM is normal without crepitation or contracture. No instability or subluxation or laxity is noted. No abnormal movements.\par \par 3)         RUE- inspection and palpation shows no misalignment, asymmetry, crepitation, defects, tenderness, masses, effusions. ROM is normal without crepitation or contracture. No instability or subluxation or laxity is noted. No abnormal movements.\par \par 4)         LUE-inspection and palpation shows no misalignment, asymmetry, crepitation, defects, tenderness, masses, effusions. ROM is normal without crepitation or contracture. No instability or subluxation or laxity is noted. No abnormal movements.\par \par 5)         RLE- inspection and palpation shows no misalignment, asymmetry, crepitation, defects, tenderness, masses, effusions. ROM is normal without crepitation or contracture. No instability or subluxation or laxity is noted. No abnormal movements.\par \par 6)         LLE-inspection and palpation shows no misalignment, asymmetry, crepitation, defects, tenderness, masses, effusions. ROM is normal without crepitation or contracture. No instability or subluxation or laxity is noted. No abnormal movements.\par \par Skin\par -           Inspection of skin and subcutaneous tissue shows no rashes, lesions or ulcers\par -           Palpation of skin and subcutaneous tissue shows no rashes, no indurations, subcutaneous nodules or tightening.\par \par  Abdomen\par -           Soft; Non-tender\par \par Neurologic\par -           CN 2-12 are grossly intact\par -           No sensory or motor deficits in the upper and lower extremities\par -           Adequate strength in upper and lower extremities\par \par Psychiatric\par -           Patients judgment and insight are intact\par -           Oriented to time, place and person\par -           Recent and remote memory intact.\par

## 2022-10-31 NOTE — HISTORY OF PRESENT ILLNESS
[Lower back] : lower back [FreeTextEntry1] : Ms. Bosch is a 41-year-old woman who presents today as a follow up to discuss possible injections. She's had no change in symptoms having  back pain with radiation to the right lower extremity. Pain has been present for the past couple of months. Pain developed after no injury, accident, or fall. She describes it as severe, rating it a 10 right now and 10 at worst. Pain is constant shooting, sharp, pressure-like, cramping, and throbbing sensation that worsens in no typical pattern. Coughing/sneezing and bowel movements increase patient's pain. Lying down, standing, sitting, and walking decrease patient's pain for approximately 10-15 minutes. Denies recent change in bowel or bladder habits. Patient was taking gabapentin but wishes to discontinue secondary to side effects of drowsiness and brain fog.\par \par TODAY: She underwent a Left L3-S1 radiofrequency ablation on 8-1-2022 and a Right L3-S1 radiofrequency ablation on 7- with approximately 90% relief of her axial lower back pain. \par \par Today, she is presenting with pain that starts in her right sided buttock area and radiates into the right lower extremity. She notes associated numbness and tingling in the right leg. She states at times it hurts to walk after sitting for a long period of time. She currently rates the pain at a 8/10 on the pain scale. She states the pain is present daily. \par

## 2022-10-31 NOTE — REASON FOR VISIT
[Follow-Up Visit] : a follow-up pain management visit [FreeTextEntry2] : Follow up to discuss injection

## 2022-11-02 ENCOUNTER — APPOINTMENT (OUTPATIENT)
Dept: UROGYNECOLOGY | Facility: CLINIC | Age: 42
End: 2022-11-02

## 2022-11-02 ENCOUNTER — NON-APPOINTMENT (OUTPATIENT)
Age: 42
End: 2022-11-02

## 2022-11-29 ENCOUNTER — APPOINTMENT (OUTPATIENT)
Dept: PAIN MANAGEMENT | Facility: CLINIC | Age: 42
End: 2022-11-29

## 2023-01-26 ENCOUNTER — APPOINTMENT (OUTPATIENT)
Dept: PAIN MANAGEMENT | Facility: CLINIC | Age: 43
End: 2023-01-26